# Patient Record
Sex: FEMALE | Race: WHITE
[De-identification: names, ages, dates, MRNs, and addresses within clinical notes are randomized per-mention and may not be internally consistent; named-entity substitution may affect disease eponyms.]

---

## 2017-04-05 ENCOUNTER — HOSPITAL ENCOUNTER (OUTPATIENT)
Dept: HOSPITAL 19 - LDRO | Age: 37
Discharge: HOME | End: 2017-04-05
Attending: OBSTETRICS & GYNECOLOGY
Payer: COMMERCIAL

## 2017-04-05 VITALS — HEIGHT: 65.98 IN | WEIGHT: 223.55 LBS | BODY MASS INDEX: 35.93 KG/M2

## 2017-04-05 VITALS — HEART RATE: 100 BPM | TEMPERATURE: 98 F | SYSTOLIC BLOOD PRESSURE: 133 MMHG | DIASTOLIC BLOOD PRESSURE: 83 MMHG

## 2017-04-05 VITALS — SYSTOLIC BLOOD PRESSURE: 133 MMHG | HEART RATE: 80 BPM | DIASTOLIC BLOOD PRESSURE: 85 MMHG

## 2017-04-05 VITALS — DIASTOLIC BLOOD PRESSURE: 75 MMHG | HEART RATE: 94 BPM | SYSTOLIC BLOOD PRESSURE: 133 MMHG

## 2017-04-05 VITALS — SYSTOLIC BLOOD PRESSURE: 130 MMHG | HEART RATE: 85 BPM | DIASTOLIC BLOOD PRESSURE: 80 MMHG

## 2017-04-05 DIAGNOSIS — Z87.891: ICD-10-CM

## 2017-04-05 DIAGNOSIS — Z3A.37: ICD-10-CM

## 2017-04-05 DIAGNOSIS — R03.0: ICD-10-CM

## 2017-04-05 DIAGNOSIS — O26.893: Primary | ICD-10-CM

## 2017-04-05 LAB
ADJUSTED CALCIUM: 10.3 MG/DL (ref 8.4–10.2)
ALBUMIN SERPL-MCNC: 3.5 GM/DL (ref 3.5–5)
ALP SERPL-CCNC: 126 U/L (ref 50–136)
ALT SERPL-CCNC: 20 U/L (ref 9–52)
ANION GAP SERPL CALC-SCNC: 9 MMOL/L (ref 7–16)
BILIRUB SERPL-MCNC: 0.5 MG/DL (ref 0–1)
BUN SERPL-MCNC: 12 MG/DL (ref 7–17)
CALCIUM SERPL-MCNC: 9.9 MG/DL (ref 8.4–10.2)
CHLORIDE SERPL-SCNC: 105 MMOL/L (ref 98–107)
CO2 SERPL-SCNC: 22 MMOL/L (ref 22–30)
CREAT SERPL-SCNC: 0.74 MG/DL (ref 0.52–1.25)
ERYTHROCYTE [DISTWIDTH] IN BLOOD BY AUTOMATED COUNT: 12.6 % (ref 11.5–14.5)
GLUCOSE SERPL-MCNC: 98 MG/DL (ref 74–106)
HCT VFR BLD AUTO: 34.8 % (ref 37–47)
HGB BLD-MCNC: 12.2 G/DL (ref 12.5–16)
MCH RBC QN AUTO: 33 PG (ref 27–31)
MCHC RBC AUTO-ENTMCNC: 35 G/DL (ref 33–37)
MCV RBC AUTO: 93 FL (ref 80–100)
PH UR STRIP.AUTO: 6 [PH] (ref 5–8)
PLATELET # BLD AUTO: 191 K/MM3 (ref 130–400)
PMV BLD AUTO: 11.2 FL (ref 7.4–10.4)
POTASSIUM SERPL-SCNC: 3.7 MMOL/L (ref 3.4–5)
PROT SERPL-MCNC: 6.4 GM/DL (ref 6.4–8.2)
RBC # BLD AUTO: 3.74 M/MM3 (ref 4.1–5.3)
RBC # UR: (no result) /HPF
SODIUM SERPL-SCNC: 136 MMOL/L (ref 137–145)
SP GR UR STRIP.AUTO: 1.02 (ref 1–1.03)
SQUAMOUS # URNS: (no result) /HPF
WBC # BLD AUTO: 8.4 K/MM3 (ref 4.8–10.8)
WBC # UR: (no result) /HPF

## 2017-04-19 ENCOUNTER — HOSPITAL ENCOUNTER (INPATIENT)
Dept: HOSPITAL 19 - LDR | Age: 37
LOS: 3 days | Discharge: HOME | End: 2017-04-22
Attending: OBSTETRICS & GYNECOLOGY | Admitting: OBSTETRICS & GYNECOLOGY
Payer: COMMERCIAL

## 2017-04-19 VITALS — DIASTOLIC BLOOD PRESSURE: 51 MMHG | SYSTOLIC BLOOD PRESSURE: 124 MMHG | HEART RATE: 105 BPM

## 2017-04-19 VITALS — SYSTOLIC BLOOD PRESSURE: 153 MMHG | HEART RATE: 111 BPM | DIASTOLIC BLOOD PRESSURE: 79 MMHG

## 2017-04-19 VITALS — TEMPERATURE: 97.4 F | DIASTOLIC BLOOD PRESSURE: 70 MMHG | SYSTOLIC BLOOD PRESSURE: 122 MMHG | HEART RATE: 111 BPM

## 2017-04-19 VITALS — DIASTOLIC BLOOD PRESSURE: 92 MMHG | HEART RATE: 86 BPM | SYSTOLIC BLOOD PRESSURE: 134 MMHG

## 2017-04-19 VITALS — BODY MASS INDEX: 36.07 KG/M2 | WEIGHT: 224.43 LBS | HEIGHT: 66.03 IN

## 2017-04-19 VITALS — DIASTOLIC BLOOD PRESSURE: 66 MMHG | HEART RATE: 95 BPM | SYSTOLIC BLOOD PRESSURE: 121 MMHG

## 2017-04-19 VITALS — HEART RATE: 113 BPM | SYSTOLIC BLOOD PRESSURE: 140 MMHG | DIASTOLIC BLOOD PRESSURE: 68 MMHG

## 2017-04-19 VITALS — HEART RATE: 72 BPM | SYSTOLIC BLOOD PRESSURE: 116 MMHG | DIASTOLIC BLOOD PRESSURE: 69 MMHG

## 2017-04-19 VITALS — HEART RATE: 117 BPM | SYSTOLIC BLOOD PRESSURE: 141 MMHG | DIASTOLIC BLOOD PRESSURE: 89 MMHG

## 2017-04-19 VITALS — DIASTOLIC BLOOD PRESSURE: 68 MMHG | HEART RATE: 112 BPM | SYSTOLIC BLOOD PRESSURE: 119 MMHG

## 2017-04-19 VITALS — HEART RATE: 103 BPM | DIASTOLIC BLOOD PRESSURE: 80 MMHG | SYSTOLIC BLOOD PRESSURE: 136 MMHG

## 2017-04-19 VITALS — DIASTOLIC BLOOD PRESSURE: 65 MMHG | HEART RATE: 90 BPM | SYSTOLIC BLOOD PRESSURE: 112 MMHG

## 2017-04-19 VITALS — SYSTOLIC BLOOD PRESSURE: 132 MMHG | DIASTOLIC BLOOD PRESSURE: 80 MMHG | HEART RATE: 116 BPM

## 2017-04-19 VITALS — DIASTOLIC BLOOD PRESSURE: 75 MMHG | SYSTOLIC BLOOD PRESSURE: 129 MMHG | HEART RATE: 101 BPM

## 2017-04-19 VITALS — SYSTOLIC BLOOD PRESSURE: 129 MMHG | DIASTOLIC BLOOD PRESSURE: 66 MMHG | HEART RATE: 77 BPM

## 2017-04-19 VITALS — SYSTOLIC BLOOD PRESSURE: 136 MMHG | DIASTOLIC BLOOD PRESSURE: 70 MMHG | HEART RATE: 89 BPM

## 2017-04-19 VITALS — HEART RATE: 109 BPM | DIASTOLIC BLOOD PRESSURE: 69 MMHG | SYSTOLIC BLOOD PRESSURE: 147 MMHG

## 2017-04-19 VITALS — SYSTOLIC BLOOD PRESSURE: 142 MMHG | HEART RATE: 99 BPM | DIASTOLIC BLOOD PRESSURE: 82 MMHG

## 2017-04-19 VITALS — DIASTOLIC BLOOD PRESSURE: 59 MMHG | SYSTOLIC BLOOD PRESSURE: 117 MMHG | HEART RATE: 88 BPM

## 2017-04-19 VITALS — DIASTOLIC BLOOD PRESSURE: 95 MMHG | SYSTOLIC BLOOD PRESSURE: 131 MMHG | HEART RATE: 120 BPM

## 2017-04-19 VITALS — SYSTOLIC BLOOD PRESSURE: 124 MMHG | HEART RATE: 105 BPM | DIASTOLIC BLOOD PRESSURE: 60 MMHG | TEMPERATURE: 99.4 F

## 2017-04-19 VITALS — HEART RATE: 83 BPM | SYSTOLIC BLOOD PRESSURE: 123 MMHG | DIASTOLIC BLOOD PRESSURE: 73 MMHG

## 2017-04-19 VITALS — HEART RATE: 72 BPM | DIASTOLIC BLOOD PRESSURE: 69 MMHG | SYSTOLIC BLOOD PRESSURE: 116 MMHG | TEMPERATURE: 98.1 F

## 2017-04-19 VITALS — HEART RATE: 73 BPM | DIASTOLIC BLOOD PRESSURE: 50 MMHG | TEMPERATURE: 98 F | SYSTOLIC BLOOD PRESSURE: 96 MMHG

## 2017-04-19 VITALS — HEART RATE: 77 BPM | SYSTOLIC BLOOD PRESSURE: 124 MMHG | DIASTOLIC BLOOD PRESSURE: 69 MMHG

## 2017-04-19 VITALS — HEART RATE: 87 BPM | DIASTOLIC BLOOD PRESSURE: 67 MMHG | SYSTOLIC BLOOD PRESSURE: 127 MMHG

## 2017-04-19 VITALS — DIASTOLIC BLOOD PRESSURE: 66 MMHG | SYSTOLIC BLOOD PRESSURE: 125 MMHG | HEART RATE: 117 BPM

## 2017-04-19 VITALS — HEART RATE: 112 BPM | SYSTOLIC BLOOD PRESSURE: 138 MMHG | DIASTOLIC BLOOD PRESSURE: 65 MMHG

## 2017-04-19 VITALS — DIASTOLIC BLOOD PRESSURE: 52 MMHG | HEART RATE: 93 BPM | SYSTOLIC BLOOD PRESSURE: 94 MMHG

## 2017-04-19 VITALS — DIASTOLIC BLOOD PRESSURE: 73 MMHG | HEART RATE: 100 BPM | SYSTOLIC BLOOD PRESSURE: 121 MMHG | TEMPERATURE: 98.5 F

## 2017-04-19 VITALS — TEMPERATURE: 98.3 F | SYSTOLIC BLOOD PRESSURE: 118 MMHG | HEART RATE: 98 BPM | DIASTOLIC BLOOD PRESSURE: 65 MMHG

## 2017-04-19 VITALS — DIASTOLIC BLOOD PRESSURE: 70 MMHG | SYSTOLIC BLOOD PRESSURE: 127 MMHG | TEMPERATURE: 99.1 F | HEART RATE: 107 BPM

## 2017-04-19 VITALS — DIASTOLIC BLOOD PRESSURE: 83 MMHG | HEART RATE: 106 BPM | SYSTOLIC BLOOD PRESSURE: 147 MMHG

## 2017-04-19 VITALS — SYSTOLIC BLOOD PRESSURE: 132 MMHG | DIASTOLIC BLOOD PRESSURE: 86 MMHG | HEART RATE: 109 BPM

## 2017-04-19 VITALS — DIASTOLIC BLOOD PRESSURE: 99 MMHG | HEART RATE: 118 BPM | SYSTOLIC BLOOD PRESSURE: 163 MMHG

## 2017-04-19 VITALS — SYSTOLIC BLOOD PRESSURE: 125 MMHG | TEMPERATURE: 99.4 F | DIASTOLIC BLOOD PRESSURE: 80 MMHG | HEART RATE: 121 BPM

## 2017-04-19 VITALS — HEART RATE: 121 BPM | DIASTOLIC BLOOD PRESSURE: 80 MMHG | SYSTOLIC BLOOD PRESSURE: 125 MMHG

## 2017-04-19 VITALS — DIASTOLIC BLOOD PRESSURE: 57 MMHG | SYSTOLIC BLOOD PRESSURE: 99 MMHG | HEART RATE: 100 BPM

## 2017-04-19 VITALS — DIASTOLIC BLOOD PRESSURE: 77 MMHG | SYSTOLIC BLOOD PRESSURE: 142 MMHG | HEART RATE: 110 BPM

## 2017-04-19 VITALS — DIASTOLIC BLOOD PRESSURE: 77 MMHG | SYSTOLIC BLOOD PRESSURE: 137 MMHG | HEART RATE: 85 BPM

## 2017-04-19 VITALS — SYSTOLIC BLOOD PRESSURE: 115 MMHG | DIASTOLIC BLOOD PRESSURE: 60 MMHG | HEART RATE: 75 BPM

## 2017-04-19 VITALS — HEART RATE: 93 BPM | SYSTOLIC BLOOD PRESSURE: 108 MMHG | DIASTOLIC BLOOD PRESSURE: 61 MMHG

## 2017-04-19 VITALS — HEART RATE: 84 BPM | SYSTOLIC BLOOD PRESSURE: 128 MMHG | DIASTOLIC BLOOD PRESSURE: 70 MMHG

## 2017-04-19 VITALS — HEART RATE: 99 BPM | DIASTOLIC BLOOD PRESSURE: 66 MMHG | SYSTOLIC BLOOD PRESSURE: 99 MMHG

## 2017-04-19 VITALS — TEMPERATURE: 98.4 F | HEART RATE: 100 BPM | DIASTOLIC BLOOD PRESSURE: 83 MMHG | SYSTOLIC BLOOD PRESSURE: 145 MMHG

## 2017-04-19 VITALS — DIASTOLIC BLOOD PRESSURE: 61 MMHG | HEART RATE: 93 BPM | SYSTOLIC BLOOD PRESSURE: 136 MMHG

## 2017-04-19 VITALS — SYSTOLIC BLOOD PRESSURE: 131 MMHG | TEMPERATURE: 98.5 F | HEART RATE: 113 BPM | DIASTOLIC BLOOD PRESSURE: 66 MMHG

## 2017-04-19 VITALS — HEART RATE: 108 BPM | DIASTOLIC BLOOD PRESSURE: 73 MMHG | SYSTOLIC BLOOD PRESSURE: 129 MMHG

## 2017-04-19 VITALS — HEART RATE: 100 BPM | DIASTOLIC BLOOD PRESSURE: 73 MMHG | SYSTOLIC BLOOD PRESSURE: 124 MMHG

## 2017-04-19 VITALS — HEART RATE: 113 BPM | SYSTOLIC BLOOD PRESSURE: 116 MMHG | DIASTOLIC BLOOD PRESSURE: 55 MMHG

## 2017-04-19 VITALS — DIASTOLIC BLOOD PRESSURE: 73 MMHG | HEART RATE: 100 BPM | SYSTOLIC BLOOD PRESSURE: 134 MMHG

## 2017-04-19 VITALS — DIASTOLIC BLOOD PRESSURE: 73 MMHG | HEART RATE: 112 BPM | SYSTOLIC BLOOD PRESSURE: 122 MMHG

## 2017-04-19 VITALS — DIASTOLIC BLOOD PRESSURE: 81 MMHG | SYSTOLIC BLOOD PRESSURE: 135 MMHG | HEART RATE: 99 BPM

## 2017-04-19 VITALS — HEART RATE: 95 BPM | SYSTOLIC BLOOD PRESSURE: 102 MMHG | DIASTOLIC BLOOD PRESSURE: 61 MMHG

## 2017-04-19 VITALS — SYSTOLIC BLOOD PRESSURE: 134 MMHG | HEART RATE: 81 BPM | DIASTOLIC BLOOD PRESSURE: 81 MMHG

## 2017-04-19 VITALS — HEART RATE: 116 BPM | SYSTOLIC BLOOD PRESSURE: 131 MMHG | DIASTOLIC BLOOD PRESSURE: 97 MMHG

## 2017-04-19 VITALS — HEART RATE: 78 BPM | DIASTOLIC BLOOD PRESSURE: 63 MMHG | SYSTOLIC BLOOD PRESSURE: 117 MMHG

## 2017-04-19 VITALS — HEART RATE: 75 BPM | SYSTOLIC BLOOD PRESSURE: 90 MMHG | DIASTOLIC BLOOD PRESSURE: 53 MMHG

## 2017-04-19 DIAGNOSIS — O09.513: ICD-10-CM

## 2017-04-19 DIAGNOSIS — E03.9: ICD-10-CM

## 2017-04-19 DIAGNOSIS — Z3A.39: ICD-10-CM

## 2017-04-19 LAB
BASOPHILS # BLD: 0 10*3/UL (ref 0–0.2)
BASOPHILS NFR BLD AUTO: 0.2 % (ref 0–2)
EOSINOPHIL # BLD: 0 10*3/UL (ref 0–0.7)
EOSINOPHIL NFR BLD: 0.3 % (ref 0–4)
ERYTHROCYTE [DISTWIDTH] IN BLOOD BY AUTOMATED COUNT: 12.8 % (ref 11.5–14.5)
GRANULOCYTES # BLD AUTO: 74.2 % (ref 42.2–75.2)
HCT VFR BLD AUTO: 34.5 % (ref 37–47)
HGB BLD-MCNC: 11.9 G/DL (ref 12.5–16)
LYMPHOCYTES # BLD: 1.5 10*3/UL (ref 1.2–3.4)
LYMPHOCYTES NFR BLD: 14.4 % (ref 20–51)
MCH RBC QN AUTO: 32 PG (ref 27–31)
MCHC RBC AUTO-ENTMCNC: 35 G/DL (ref 33–37)
MCV RBC AUTO: 94 FL (ref 80–100)
MONOCYTES # BLD: 1 10*3/UL (ref 0.1–0.6)
MONOCYTES NFR BLD AUTO: 10.1 % (ref 1.7–9.3)
NEUTROPHILS # BLD: 7.6 10*3/UL (ref 1.4–6.5)
PLATELET # BLD AUTO: 189 K/MM3 (ref 130–400)
PMV BLD AUTO: 11.2 FL (ref 7.4–10.4)
RBC # BLD AUTO: 3.67 M/MM3 (ref 4.1–5.3)
WBC # BLD AUTO: 10.3 K/MM3 (ref 4.8–10.8)

## 2017-04-20 VITALS — TEMPERATURE: 97.8 F | DIASTOLIC BLOOD PRESSURE: 56 MMHG | SYSTOLIC BLOOD PRESSURE: 109 MMHG | HEART RATE: 77 BPM

## 2017-04-20 VITALS — HEART RATE: 85 BPM | TEMPERATURE: 98 F | SYSTOLIC BLOOD PRESSURE: 122 MMHG | DIASTOLIC BLOOD PRESSURE: 65 MMHG

## 2017-04-20 VITALS — SYSTOLIC BLOOD PRESSURE: 149 MMHG | DIASTOLIC BLOOD PRESSURE: 76 MMHG | TEMPERATURE: 97.6 F | HEART RATE: 86 BPM

## 2017-04-20 VITALS — HEART RATE: 78 BPM | DIASTOLIC BLOOD PRESSURE: 67 MMHG | TEMPERATURE: 97.4 F | SYSTOLIC BLOOD PRESSURE: 132 MMHG

## 2017-04-20 VITALS — HEART RATE: 91 BPM | SYSTOLIC BLOOD PRESSURE: 143 MMHG | TEMPERATURE: 97.9 F | DIASTOLIC BLOOD PRESSURE: 87 MMHG

## 2017-04-20 VITALS — SYSTOLIC BLOOD PRESSURE: 116 MMHG | HEART RATE: 82 BPM | DIASTOLIC BLOOD PRESSURE: 66 MMHG

## 2017-04-20 LAB
HCT VFR BLD AUTO: 28.8 % (ref 37–47)
HGB BLD-MCNC: 9.9 G/DL (ref 12.5–16)

## 2017-04-21 VITALS — DIASTOLIC BLOOD PRESSURE: 67 MMHG | HEART RATE: 77 BPM | SYSTOLIC BLOOD PRESSURE: 129 MMHG | TEMPERATURE: 97.8 F

## 2017-04-21 VITALS — TEMPERATURE: 97.8 F | SYSTOLIC BLOOD PRESSURE: 132 MMHG | HEART RATE: 78 BPM | DIASTOLIC BLOOD PRESSURE: 67 MMHG

## 2017-04-21 VITALS — SYSTOLIC BLOOD PRESSURE: 120 MMHG | DIASTOLIC BLOOD PRESSURE: 69 MMHG | HEART RATE: 77 BPM | TEMPERATURE: 98.7 F

## 2017-04-21 VITALS — TEMPERATURE: 98 F | SYSTOLIC BLOOD PRESSURE: 134 MMHG | HEART RATE: 81 BPM | DIASTOLIC BLOOD PRESSURE: 83 MMHG

## 2017-04-22 VITALS — HEART RATE: 80 BPM | TEMPERATURE: 97.7 F | SYSTOLIC BLOOD PRESSURE: 139 MMHG | DIASTOLIC BLOOD PRESSURE: 85 MMHG

## 2020-11-02 ENCOUNTER — HOSPITAL ENCOUNTER (OUTPATIENT)
Dept: HOSPITAL 19 - DIA.ED | Age: 40
End: 2020-11-02
Attending: OBSTETRICS & GYNECOLOGY
Payer: COMMERCIAL

## 2020-11-02 DIAGNOSIS — E03.9: ICD-10-CM

## 2020-11-02 DIAGNOSIS — O24.419: Primary | ICD-10-CM

## 2020-11-02 PROCEDURE — G0108 DIAB MANAGE TRN  PER INDIV: HCPCS

## 2020-11-11 ENCOUNTER — HOSPITAL ENCOUNTER (OUTPATIENT)
Dept: HOSPITAL 19 - DIA.ED | Age: 40
End: 2020-11-11
Attending: OBSTETRICS & GYNECOLOGY
Payer: COMMERCIAL

## 2020-11-11 DIAGNOSIS — E03.9: ICD-10-CM

## 2020-11-11 DIAGNOSIS — O24.419: Primary | ICD-10-CM

## 2020-11-11 DIAGNOSIS — Z79.4: ICD-10-CM

## 2020-11-11 PROCEDURE — G0108 DIAB MANAGE TRN  PER INDIV: HCPCS

## 2020-11-23 ENCOUNTER — HOSPITAL ENCOUNTER (OUTPATIENT)
Dept: HOSPITAL 19 - DIA.ED | Age: 40
End: 2020-11-23
Attending: OBSTETRICS & GYNECOLOGY
Payer: COMMERCIAL

## 2020-11-23 DIAGNOSIS — O24.419: Primary | ICD-10-CM

## 2020-11-23 DIAGNOSIS — Z79.4: ICD-10-CM

## 2020-11-23 PROCEDURE — G0108 DIAB MANAGE TRN  PER INDIV: HCPCS

## 2021-01-08 ENCOUNTER — HOSPITAL ENCOUNTER (OUTPATIENT)
Dept: HOSPITAL 19 - ZCOL.LAB | Age: 41
End: 2021-01-08
Attending: OBSTETRICS & GYNECOLOGY
Payer: COMMERCIAL

## 2021-01-08 DIAGNOSIS — Z20.822: Primary | ICD-10-CM

## 2021-01-12 ENCOUNTER — HOSPITAL ENCOUNTER (INPATIENT)
Dept: HOSPITAL 19 - OB | Age: 41
LOS: 1 days | Discharge: HOME | End: 2021-01-13
Attending: OBSTETRICS & GYNECOLOGY | Admitting: OBSTETRICS & GYNECOLOGY
Payer: COMMERCIAL

## 2021-01-12 VITALS — SYSTOLIC BLOOD PRESSURE: 135 MMHG | DIASTOLIC BLOOD PRESSURE: 83 MMHG | TEMPERATURE: 98.1 F | HEART RATE: 99 BPM

## 2021-01-12 VITALS — SYSTOLIC BLOOD PRESSURE: 80 MMHG | HEART RATE: 106 BPM | DIASTOLIC BLOOD PRESSURE: 31 MMHG

## 2021-01-12 VITALS — DIASTOLIC BLOOD PRESSURE: 69 MMHG | SYSTOLIC BLOOD PRESSURE: 88 MMHG | HEART RATE: 119 BPM

## 2021-01-12 VITALS — TEMPERATURE: 98.7 F | HEART RATE: 105 BPM | DIASTOLIC BLOOD PRESSURE: 81 MMHG | SYSTOLIC BLOOD PRESSURE: 139 MMHG

## 2021-01-12 VITALS — HEART RATE: 104 BPM | DIASTOLIC BLOOD PRESSURE: 86 MMHG | SYSTOLIC BLOOD PRESSURE: 112 MMHG

## 2021-01-12 VITALS — DIASTOLIC BLOOD PRESSURE: 81 MMHG | HEART RATE: 100 BPM | SYSTOLIC BLOOD PRESSURE: 122 MMHG

## 2021-01-12 VITALS — HEART RATE: 101 BPM | DIASTOLIC BLOOD PRESSURE: 64 MMHG | SYSTOLIC BLOOD PRESSURE: 93 MMHG

## 2021-01-12 VITALS — WEIGHT: 234.57 LBS | BODY MASS INDEX: 37.7 KG/M2 | HEIGHT: 66 IN

## 2021-01-12 VITALS — DIASTOLIC BLOOD PRESSURE: 81 MMHG | SYSTOLIC BLOOD PRESSURE: 135 MMHG | HEART RATE: 89 BPM

## 2021-01-12 VITALS — SYSTOLIC BLOOD PRESSURE: 112 MMHG | HEART RATE: 98 BPM | DIASTOLIC BLOOD PRESSURE: 95 MMHG

## 2021-01-12 VITALS — DIASTOLIC BLOOD PRESSURE: 84 MMHG | SYSTOLIC BLOOD PRESSURE: 129 MMHG | HEART RATE: 87 BPM

## 2021-01-12 VITALS — HEART RATE: 105 BPM | SYSTOLIC BLOOD PRESSURE: 139 MMHG | TEMPERATURE: 98 F | DIASTOLIC BLOOD PRESSURE: 81 MMHG

## 2021-01-12 VITALS — HEART RATE: 84 BPM | DIASTOLIC BLOOD PRESSURE: 71 MMHG | SYSTOLIC BLOOD PRESSURE: 144 MMHG

## 2021-01-12 VITALS — DIASTOLIC BLOOD PRESSURE: 48 MMHG | HEART RATE: 103 BPM | SYSTOLIC BLOOD PRESSURE: 69 MMHG

## 2021-01-12 VITALS — SYSTOLIC BLOOD PRESSURE: 132 MMHG | DIASTOLIC BLOOD PRESSURE: 82 MMHG | TEMPERATURE: 97.8 F | HEART RATE: 82 BPM

## 2021-01-12 VITALS — DIASTOLIC BLOOD PRESSURE: 89 MMHG | TEMPERATURE: 97.9 F | HEART RATE: 84 BPM | SYSTOLIC BLOOD PRESSURE: 146 MMHG

## 2021-01-12 VITALS — SYSTOLIC BLOOD PRESSURE: 113 MMHG | DIASTOLIC BLOOD PRESSURE: 89 MMHG | HEART RATE: 102 BPM

## 2021-01-12 VITALS — HEART RATE: 88 BPM | SYSTOLIC BLOOD PRESSURE: 130 MMHG | DIASTOLIC BLOOD PRESSURE: 78 MMHG

## 2021-01-12 VITALS — HEART RATE: 99 BPM | SYSTOLIC BLOOD PRESSURE: 135 MMHG | DIASTOLIC BLOOD PRESSURE: 83 MMHG

## 2021-01-12 DIAGNOSIS — F41.9: ICD-10-CM

## 2021-01-12 DIAGNOSIS — E03.9: ICD-10-CM

## 2021-01-12 DIAGNOSIS — F32.9: ICD-10-CM

## 2021-01-12 DIAGNOSIS — O34.211: Primary | ICD-10-CM

## 2021-01-12 DIAGNOSIS — N80.9: ICD-10-CM

## 2021-01-12 DIAGNOSIS — Z3A.39: ICD-10-CM

## 2021-01-12 DIAGNOSIS — O34.83: ICD-10-CM

## 2021-01-12 DIAGNOSIS — F90.9: ICD-10-CM

## 2021-01-12 LAB
BASOPHILS # BLD: 0 10*3/UL (ref 0–0.2)
BASOPHILS NFR BLD AUTO: 0.2 % (ref 0–2)
EOSINOPHIL # BLD: 0.1 10*3/UL (ref 0–0.7)
EOSINOPHIL NFR BLD: 0.6 % (ref 0–4)
ERYTHROCYTE [DISTWIDTH] IN BLOOD BY AUTOMATED COUNT: 12.8 % (ref 11.5–14.5)
GRANULOCYTES # BLD AUTO: 68.8 % (ref 42.2–75.2)
HCT VFR BLD AUTO: 37.4 % (ref 37–47)
HGB BLD-MCNC: 12.8 G/DL (ref 12.5–16)
LYMPHOCYTES # BLD: 2.1 10*3/UL (ref 1.2–3.4)
LYMPHOCYTES NFR BLD: 19.5 % (ref 20–51)
MCH RBC QN AUTO: 31 PG (ref 27–31)
MCHC RBC AUTO-ENTMCNC: 34 G/DL (ref 33–37)
MCV RBC AUTO: 89 FL (ref 80–100)
MONOCYTES # BLD: 1.1 10*3/UL (ref 0.1–0.6)
MONOCYTES NFR BLD AUTO: 10.6 % (ref 1.7–9.3)
NEUTROPHILS # BLD: 7.4 10*3/UL (ref 1.4–6.5)
PLATELET # BLD AUTO: 261 K/MM3 (ref 130–400)
PMV BLD AUTO: 10.8 FL (ref 7.4–10.4)
RBC # BLD AUTO: 4.19 M/MM3 (ref 4.1–5.3)

## 2021-01-12 NOTE — NUR
Patient ambulatory onto unit for scheduled  section with  at
side. Patient oriented to room and plan of care. Changes into gown.
Patient reports good fetal movement and occasional contractions. Patient
denies vaginal bleeding or leaking of fluid.
EFMs on, VS taken. IV started by Zoe RN in left hand. LR infusing per
orders. Assessment completed. Consents signed.

## 2021-01-13 LAB
HCT VFR BLD AUTO: 32.6 % (ref 37–47)
HGB BLD-MCNC: 11.2 G/DL (ref 12.5–16)

## 2021-01-13 NOTE — NUR
Initial visit attempt; Hearing Screening taking place,  left card of
congratulations and information regarding the availability of spiritual care
at our hospital.